# Patient Record
(demographics unavailable — no encounter records)

---

## 2024-10-17 NOTE — HISTORY OF PRESENT ILLNESS
[FreeTextEntry1] : Annual [de-identified] : Annual Last colon/egd 3 years ago  On statin see cardiologist Dr Wade on omeprazole long term for gerd

## 2024-10-17 NOTE — HEALTH RISK ASSESSMENT
[Very Good] : ~his/her~  mood as very good [Yes] : Yes [2 - 4 times a month (2 pts)] : 2-4 times a month (2 points) [Never (0 pts)] : Never (0 points) [No falls in past year] : Patient reported no falls in the past year [0] : 2) Feeling down, depressed, or hopeless: Not at all (0) [PHQ-2 Negative - No further assessment needed] : PHQ-2 Negative - No further assessment needed [[id number]] : 0 [Current] : Current [de-identified] : Smokes occasional cigars [NO] : No [Change in mental status noted] : No change in mental status noted [Language] : denies difficulty with language [Behavior] : denies difficulty with behavior [Learning/Retaining New Information] : denies difficulty learning/retaining new information [Handling Complex Tasks] : denies difficulty handling complex tasks [Reasoning] : denies difficulty with reasoning [Spatial Ability and Orientation] : denies difficulty with spatial ability and orientation [None] : None [With Family] : lives with family [Employed] : employed [College] : College [] :  [Feels Safe at Home] : Feels safe at home [Fully functional (bathing, dressing, toileting, transferring, walking, feeding)] : Fully functional (bathing, dressing, toileting, transferring, walking, feeding) [Fully functional (using the telephone, shopping, preparing meals, housekeeping, doing laundry, using] : Fully functional and needs no help or supervision to perform IADLs (using the telephone, shopping, preparing meals, housekeeping, doing laundry, using transportation, managing medications and managing finances) [Reports changes in hearing] : Reports no changes in hearing [Reports changes in vision] : Reports no changes in vision [Reports changes in dental health] : Reports no changes in dental health [Smoke Detector] : smoke detector [Carbon Monoxide Detector] : carbon monoxide detector [Seat Belt] :  uses seat belt

## 2024-10-17 NOTE — REVIEW OF SYSTEMS
1.  You were seen in the ENT Clinic today by Dr. Dong.  If you have any questions or concerns after your appointment, please call [phone].    2.  Plan is to return to clinic in 6 weeks after surgery with an audiogram    Thank you!  Filiberto Underwood RN  [facility]      [facility]    Caring for Your Child after P.E. Tubes (Pressure Equalization Tubes)    What to expect after surgery:    Small amount of drainage is normal.  Drainage may be thin, pink or watery. May last for about 3 days.    Ear ache and slight discomfort day of surgery  Ear tubes do not prevent all ear infections however will reduce the frequency of the infections.    Care after surgery:    The tubes usually remain in the ear for about 6 to 9 months. This can vary from child to child.    It is important to take the ear drops as they are ordered and for the full length of time.    There are NO precautions needed when in contact with water    Activity:    Ok to go swimming 3-4 days after surgery or after drainage resolves.    Ear plugs are not needed if swimming in a pool with chlorine.     USE ear plugs if swimming in a lake, ocean, pond or river due to bacteria in the water.    Pain/Medication:    Tylenol may be used if child is having pain after surgery during the first day or two.    Ear drops may be prescribed by your doctor.   Give ______ drops ______ times a day for ______ days in ______ ear.  Your nurse will show you how to position the ear to give the ear drops.  Place a small amount of cotton in ear canal after inserting drops. Remove cotton after a few minutes.    Follow up:    Follow up with your doctor _______ weeks after surgery. During the follow up appointment, your child will have a hearing test done. This follow-up visit ensures that the ear tubes are in place and the ears are healing.  If you have not scheduled this appointment, please call [phone] to schedule.    When to  call us:    Drainage that is thick, green, yellow, milky  or even bloody    Drainage that has a bad odor     Drainage that lasts more than 3 days after surgery or develops at a later time     You see a sticky or discolored fluid draining from the ear after 48 hours    Pain for more than 48 hours after surgery and not relieved by Tylenol    Your child has a temperature over 101 F and does not go down    If your child is dizzy, confused, extremely drowsy or has any change in their mental status    Important Phone Numbers:  [facility]---[facility]    During office hours: [phone]    After hours: [phone] (ask to page the Pediatric ENT resident who is on-call)    Rev. 5/2018     [Negative] : Gastrointestinal

## 2024-10-17 NOTE — PLAN
[FreeTextEntry1] : annual decline flu shot colon 3 years ago gerd on omeprazole need f/u upper endoscopy Had area of colitis hepatic lfexure last colon need reassessment bp good check lipids